# Patient Record
Sex: MALE | Race: WHITE | Employment: FULL TIME | URBAN - METROPOLITAN AREA
[De-identification: names, ages, dates, MRNs, and addresses within clinical notes are randomized per-mention and may not be internally consistent; named-entity substitution may affect disease eponyms.]

---

## 2020-09-05 ENCOUNTER — HOSPITAL ENCOUNTER (EMERGENCY)
Facility: CLINIC | Age: 45
Discharge: HOME | End: 2020-09-05
Attending: NURSE PRACTITIONER | Admitting: NURSE PRACTITIONER
Payer: COMMERCIAL

## 2020-09-05 VITALS
TEMPERATURE: 97.8 F | DIASTOLIC BLOOD PRESSURE: 76 MMHG | HEART RATE: 82 BPM | WEIGHT: 200 LBS | OXYGEN SATURATION: 93 % | RESPIRATION RATE: 12 BRPM | SYSTOLIC BLOOD PRESSURE: 116 MMHG

## 2020-09-05 DIAGNOSIS — T78.2XXA ANAPHYLAXIS, INITIAL ENCOUNTER: ICD-10-CM

## 2020-09-05 LAB — ALCOHOL BREATH TEST: 0.06 (ref 0–0.01)

## 2020-09-05 PROCEDURE — 96374 THER/PROPH/DIAG INJ IV PUSH: CPT | Performed by: NURSE PRACTITIONER

## 2020-09-05 PROCEDURE — 25800030 ZZH RX IP 258 OP 636: Performed by: NURSE PRACTITIONER

## 2020-09-05 PROCEDURE — 99284 EMERGENCY DEPT VISIT MOD MDM: CPT | Mod: 25 | Performed by: NURSE PRACTITIONER

## 2020-09-05 PROCEDURE — 99284 EMERGENCY DEPT VISIT MOD MDM: CPT | Mod: Z6 | Performed by: NURSE PRACTITIONER

## 2020-09-05 PROCEDURE — 96361 HYDRATE IV INFUSION ADD-ON: CPT | Performed by: NURSE PRACTITIONER

## 2020-09-05 PROCEDURE — 96375 TX/PRO/DX INJ NEW DRUG ADDON: CPT | Performed by: NURSE PRACTITIONER

## 2020-09-05 PROCEDURE — 25000125 ZZHC RX 250: Performed by: NURSE PRACTITIONER

## 2020-09-05 PROCEDURE — 25000128 H RX IP 250 OP 636: Performed by: NURSE PRACTITIONER

## 2020-09-05 RX ORDER — PREDNISONE 20 MG/1
TABLET ORAL
Qty: 10 TABLET | Refills: 0 | Status: SHIPPED | OUTPATIENT
Start: 2020-09-05

## 2020-09-05 RX ORDER — EPINEPHRINE 0.3 MG/.3ML
0.3 INJECTION SUBCUTANEOUS
Qty: 1 EACH | Refills: 0 | Status: SHIPPED | OUTPATIENT
Start: 2020-09-05

## 2020-09-05 RX ORDER — METHYLPREDNISOLONE SODIUM SUCCINATE 125 MG/2ML
125 INJECTION, POWDER, LYOPHILIZED, FOR SOLUTION INTRAMUSCULAR; INTRAVENOUS ONCE
Status: COMPLETED | OUTPATIENT
Start: 2020-09-05 | End: 2020-09-05

## 2020-09-05 RX ORDER — DIAZEPAM 5 MG
5 TABLET ORAL EVERY 6 HOURS PRN
COMMUNITY

## 2020-09-05 RX ADMIN — METHYLPREDNISOLONE SODIUM SUCCINATE 125 MG: 125 INJECTION, POWDER, FOR SOLUTION INTRAMUSCULAR; INTRAVENOUS at 15:15

## 2020-09-05 RX ADMIN — Medication 40 MG: at 15:15

## 2020-09-05 RX ADMIN — SODIUM CHLORIDE 1000 ML: 9 INJECTION, SOLUTION INTRAVENOUS at 15:13

## 2020-09-05 NOTE — DISCHARGE INSTRUCTIONS
I have prescribed you prednisone which I want you to start tomorrow morning and take for the next 5 days, this is a steroid and will help prevent recurrence of your anaphylactic/allergy type symptoms.  You can take 25 mg of Benadryl every 4-6 hours as needed for rash and itching  You may take a Zyrtec or Claritin daily for the next 5 days as well.  I have prescribed you EpiPen's which she should have on hand at all times and can use in the future if you are stung again.

## 2020-09-05 NOTE — ED AVS SNAPSHOT
[facility] Emergency Department  [address] [place] [address]  Phone:  [phone]  Fax:  [phone]                                    Randal Stock   MRN: [id number]    Department:  [facility] Emergency Department   Date of Visit:  9/5/2020           After Visit Summary Signature Page    I have received my discharge instructions, and my questions have been answered. I have discussed any challenges I see with this plan with the nurse or doctor.    ..........................................................................................................................................  Patient/Patient Representative Signature      ..........................................................................................................................................  Patient Representative Print Name and Relationship to Patient    ..................................................               ................................................  Date                                   Time    ..........................................................................................................................................  Reviewed by Signature/Title    ...................................................              ..............................................  Date                                               Time          22EPIC Rev 08/18

## 2020-09-05 NOTE — ED PROVIDER NOTES
History     Chief Complaint   Patient presents with     Allergic Reaction     HPI  Randal Martin V is a 44 year old male who presents to the ED today via EMS s/p bee sting to left cheek [place].  Patient denies any hx of bee sting allergies, however, had lip swelling and difficulty breathing after sting.  Patient was given IM epinephrine and IV Benadryl prior to arrival here.  Patient complaints of a dry throat on arrival.  Patient arrives cold, tremulous.     Allergies:  Allergies   Allergen Reactions     Shellfish-Derived Products        Problem List:    There are no active problems to display for this patient.       Past Medical History:    No past medical history on file.    Past Surgical History:    No past surgical history on file.    Family History:    No family history on file.    Social History:  Marital Status:    Social History     Tobacco Use     Smoking status: Not on file   Substance Use Topics     Alcohol use: Not on file     Drug use: Not on file        Medications:    No current outpatient medications on file.        Review of Systems   All other systems reviewed and are negative.      Physical Exam          Physical Exam  Constitutional:       General: He is not in acute distress.     Appearance: Normal appearance. He is not toxic-appearing.   HENT:      Head: Normocephalic.      Mouth/Throat:      Mouth: Mucous membranes are dry.      Comments: No intra-oral swelling, posterior oropharynx clear  Eyes:      Extraocular Movements: Extraocular movements intact.   Neck:      Musculoskeletal: Normal range of motion.   Cardiovascular:      Rate and Rhythm: Tachycardia present.      Pulses: Normal pulses.   Pulmonary:      Effort: Pulmonary effort is normal. No respiratory distress.      Breath sounds: Normal breath sounds. No stridor. No wheezing or rhonchi.   Abdominal:      General: Bowel sounds are normal.      Palpations: Abdomen is soft.   Musculoskeletal: Normal range of  motion.   Skin:     General: Skin is warm.      Findings: Rash (urticarial rash to lower extremities) present.   Neurological:      General: No focal deficit present.      Mental Status: He is alert.      Comments: tremulous   Psychiatric:         Mood and Affect: Mood normal.         ED Course        Procedures    No results found for this or any previous visit (from the past 24 hour(s)).    Medications   0.9% sodium chloride BOLUS (0 mLs Intravenous Stopped 9/5/20 1615)   methylPREDNISolone sodium succinate (solu-MEDROL) injection 125 mg (125 mg Intravenous Given 9/5/20 1515)   famotidine (PEPCID) injection 40 mg (40 mg Intravenous Given 9/5/20 1515)       Assessments & Plan (with Medical Decision Making)  Anaphylactic reaction to bee sting  Patient was monitored here for 3 hours with no further need of additional epinephrine, he did receive IV Solu-Medrol as well as IV Pepcid, he did require a small amount of supplemental oxygen per nasal cannula when he was sleeping but oxygenation has remained above 91% on room air while awake likely lower baseline given his smoking history, patient's airway is intact, breathing is nonlabored, his hives have dissipated and I feel he is stable to be discharged home.  I will discharge patient home with prednisone for the next 5 days to be started tomorrow morning as well as EpiPen's, patient instructed on its use.  I did recommend Benadryl as needed, daily Zyrtec or Claritin for the next few days as well.  Reasons to return to the emergency room discussed.  Patient is agreeable to plan of care and discharged in stable condition.     I have reviewed the nursing notes.    I have reviewed the findings, diagnosis, plan and need for follow up with the patient.    New Prescriptions    EPINEPHRINE (ANY BX GENERIC EQUIV) 0.3 MG/0.3ML INJECTION 2-PACK    Inject 0.3 mLs (0.3 mg) into the muscle once as needed for anaphylaxis    PREDNISONE (DELTASONE) 20 MG TABLET    Take two tablets (=  40mg) each day for 5 (five) days       Final diagnoses:   Anaphylaxis, initial encounter       9/5/2020   [facility] EMERGENCY DEPARTMENT     Mohini Hutchins APRN CNP  09/05/20 1802       Mohini Hutchins APRN CNP  09/05/20 1802